# Patient Record
Sex: FEMALE | URBAN - METROPOLITAN AREA
[De-identification: names, ages, dates, MRNs, and addresses within clinical notes are randomized per-mention and may not be internally consistent; named-entity substitution may affect disease eponyms.]

---

## 2017-03-31 ENCOUNTER — APPOINTMENT (RX ONLY)
Age: 42
Setting detail: DERMATOLOGY
End: 2017-03-31

## 2017-03-31 DIAGNOSIS — L70.0 ACNE VULGARIS: ICD-10-CM

## 2017-03-31 DIAGNOSIS — H01.13 ECZEMATOUS DERMATITIS OF EYELID: ICD-10-CM

## 2017-03-31 PROBLEM — H01.139 ECZEMATOUS DERMATITIS OF UNSPECIFIED EYE, UNSPECIFIED EYELID: Status: ACTIVE | Noted: 2017-03-31

## 2017-03-31 PROBLEM — L29.8 OTHER PRURITUS: Status: ACTIVE | Noted: 2017-03-31

## 2017-03-31 PROBLEM — L20.84 INTRINSIC (ALLERGIC) ECZEMA: Status: ACTIVE | Noted: 2017-03-31

## 2017-03-31 PROCEDURE — ? DIAGNOSIS COMMENT

## 2017-03-31 PROCEDURE — ? PRESCRIPTION

## 2017-03-31 PROCEDURE — 99213 OFFICE O/P EST LOW 20 MIN: CPT

## 2017-03-31 PROCEDURE — ? OTHER

## 2017-03-31 RX ORDER — HYDROCORTISONE 2.5 %
CREAM (GRAM) TOPICAL
Qty: 1 | Refills: 2 | Status: ERX | COMMUNITY
Start: 2017-03-31

## 2017-03-31 RX ADMIN — Medication: at 23:07

## 2017-03-31 NOTE — HPI: FREE FORM (FOLLOW UP HISTORY)
How Severe Is Your Skin Condition?: moderate
What Brings You In Today For Follow Up? (This Is An Xx Year Old Patient Who Is Following Up For:): Seborrheic Dermatitis
Where On Your Body Is It? (Located On:): Face
What Was It Treated With? (The Condition Was Treated With:): Ketoconozole Shampoo and Ketoconazole cream
How Did You Use It? (The Patient Used The Treatment In The Following Way:): shampoo TIW\\nCream BID
Since The Treatment Is Your Condition Better, Worse, Or Unchanged? (Since The Treatment, The Condition Is:): Worse

## 2017-03-31 NOTE — PROCEDURE: OTHER
Note Text (......Xxx Chief Complaint.): This diagnosis correlates with the
Other (Free Text): Morning:\\n-BPO wash\\n-Clindamycin Solution\\n\\nEvening:\\n-Cleanser\\n-Tretinoin 0.05%\\n\\n
Detail Level: Zone
Other (Free Text): Morning:\\n-Ketoconazole 2% cream\\n-Hydrocortisone 2.5% Cream\\n\\nEvening:\\n-Ketoconazole 2% cream\\n-Hydrocortisone 2.5% cream

## 2017-03-31 NOTE — PROCEDURE: DIAGNOSIS COMMENT
Detail Level: Zone
Comment: Bilateral upper and lower eyelid dermatitis with lichenification, along with eczematous patches along preauricular skin and alar grooves. DDx atopic dermatitis vs contact dermatitis (to tretinoin, though patient is careful to avoid eyelids, or other) vs seborrheic dermatitis.
Comment: much improved

## 2017-03-31 NOTE — HPI: SECONDARY COMPLAINT
How Severe Is This Condition?: mild
Additional History: Patient states acne is doing better, very few breakouts.

## 2017-04-28 ENCOUNTER — APPOINTMENT (RX ONLY)
Age: 42
Setting detail: DERMATOLOGY
End: 2017-04-28

## 2017-04-28 DIAGNOSIS — L70.0 ACNE VULGARIS: ICD-10-CM | Status: STABLE

## 2017-04-28 DIAGNOSIS — H01.13 ECZEMATOUS DERMATITIS OF EYELID: ICD-10-CM

## 2017-04-28 PROBLEM — H01.139 ECZEMATOUS DERMATITIS OF UNSPECIFIED EYE, UNSPECIFIED EYELID: Status: ACTIVE | Noted: 2017-04-28

## 2017-04-28 PROCEDURE — ? PRESCRIPTION

## 2017-04-28 PROCEDURE — 99213 OFFICE O/P EST LOW 20 MIN: CPT

## 2017-04-28 PROCEDURE — ? TREATMENT REGIMEN

## 2017-04-28 PROCEDURE — ? DIAGNOSIS COMMENT

## 2017-04-28 RX ORDER — PIMECROLIMUS 10 MG/G
CREAM TOPICAL BID
Qty: 1 | Refills: 1 | Status: ERX | COMMUNITY
Start: 2017-04-28

## 2017-04-28 RX ADMIN — PIMECROLIMUS: 10 CREAM TOPICAL at 22:29

## 2017-04-28 NOTE — HPI: FREE FORM (FOLLOW UP HISTORY)
How Severe Is Your Skin Condition?: moderate
What Brings You In Today For Follow Up? (This Is An Xx Year Old Patient Who Is Following Up For:): Eyelid dermatitis
Where On Your Body Is It? (Located On:): Face
What Was It Treated With? (The Condition Was Treated With:): Hydrocortisone and ketoconazole
Since The Treatment Is Your Condition Better, Worse, Or Unchanged? (Since The Treatment, The Condition Is:): Unchanged. Was better with medications until she started to taper off of the creams. Rash started to come back.

## 2017-04-28 NOTE — PROCEDURE: TREATMENT REGIMEN
Otc Regimen: BPO wash
Initiate Treatment: Elidel twice daily
Detail Level: Simple
Continue Regimen: Clindamycin solution once daily in the morning\\nTretinoin cream once daily in the evening
Discontinue Regimen: Hydrocortisone 2.5%
Continue Regimen: Ketoconazole 2% cream twice a day